# Patient Record
Sex: MALE | Race: WHITE
[De-identification: names, ages, dates, MRNs, and addresses within clinical notes are randomized per-mention and may not be internally consistent; named-entity substitution may affect disease eponyms.]

---

## 2020-01-09 ENCOUNTER — HOSPITAL ENCOUNTER (OUTPATIENT)
Dept: HOSPITAL 74 - JASU-SURG | Age: 75
Discharge: HOME | End: 2020-01-09
Attending: OPHTHALMOLOGY
Payer: COMMERCIAL

## 2020-01-09 VITALS — DIASTOLIC BLOOD PRESSURE: 72 MMHG | TEMPERATURE: 98.2 F | HEART RATE: 59 BPM | SYSTOLIC BLOOD PRESSURE: 156 MMHG

## 2020-01-09 VITALS — BODY MASS INDEX: 34.4 KG/M2

## 2020-01-09 DIAGNOSIS — E11.9: ICD-10-CM

## 2020-01-09 DIAGNOSIS — I10: ICD-10-CM

## 2020-01-09 DIAGNOSIS — H26.9: Primary | ICD-10-CM

## 2020-01-09 PROCEDURE — 08RJ3JZ REPLACEMENT OF RIGHT LENS WITH SYNTHETIC SUBSTITUTE, PERCUTANEOUS APPROACH: ICD-10-PCS | Performed by: OPHTHALMOLOGY

## 2020-01-09 NOTE — HP
History & Physical Update





- History


History: No Change





- Physical


Physical: No Change





- Assessment


Assessment: No Change





- Plan


Plan: No Change (H and P reviewed by Dr. Ayala from 1/7/2020 no changes)

## 2020-01-09 NOTE — OP
DATE OF OPERATION:  

 

DATE OF DICTATION:  01/09/2020

 

PREOPERATIVE DIAGNOSIS:  Cortical cataract, right eye.

 

POSTOPERATIVE DIAGNOSIS:  Cortical cataract, right eye.

 

PROCEDURE:  Phacoemulsification and cataract extraction with insertion of 
posterior

chamber intraocular lens, right eye.

 

SURGEON:  Annmarie Roblero MD

 

ASSISTANT:  None.

 

ANESTHESIA:  Topical.

 

ANESTHESIOLOGIST:  Monik Davenport CRNA

 

OPERATIVE PROCEDURE:  The patient received Tetracaine eye drops and was gently

sedated and prepped and draped in the usual sterile fashion so as to expose 
only the

right eye.  Ophthalmic Betadine was instilled into the inferior fornix and 
lashes

were taped out of the surgical field.  An eyelid speculum was placed into the 
right

eye.  Paracentesis was made in superior temporal clear cornea at the limbus.  
Then,

0.5 mL of nonpreserved lidocaine 1% was injected into the anterior chamber and 
then 1

mL of dilute epinephrine 1:10,000 was injected into the anterior chamber to 
improve

pupillary dilation.  Viscoelastic material was instilled into the anterior 
chamber

via the paracentesis.  A 2.4-mm keratome blade was then used to create the main

incision in temporal clear cornea at the limbus.  A continuous curvilinear

capsulorhexis was performed using a cystotome and Utrata forceps.  
Hydrodissection of

the lens cortex was performed using BSS on a cannula until the nucleus was 
noted to

be freely rotating.  The phacoemulsification tip was then inserted via the main 
wound

and used to scope 2 perpendicular grooves into the lens nucleus.  The nucleus 
was

cracked into 4 quadrants.  Each quadrant was lifted out of the capsule into the 
iris

plane and individually phacoemulsified.  The remaining cortical material was 
then

aspirated using the irrigation/aspiration port.  The capsular bag was inflated 
using

ProVisc and a preloaded AcrySof lens model AU00T0 power +19.5 diopters was 
injected

into the capsular bag.  It was centered using a Sinskey hook.  The residual

viscoelastic material was removed from the anterior chamber using irrigation and

aspiration.  The wound edges were hydrated using BSS.  The wound was tested for

leakage and was found to be watertight.  Tobradex ointment was placed in the eye
, and

the speculum was removed from the eye, and the eyelid was closed.  A sterile 
dressing

and shield were placed over the eye.  The patient was transferred to the 
recovery

room in stable condition, told to follow up in 1 day.

 

 

ROSA [id number]

DD: 01/09/2020 12:13

DT: 01/09/2020 12:39

Job #:  [id number]

MTDD

## 2020-01-09 NOTE — OP
Ophthalmology Operative Note


Pre-Operative Diagnosis: Cataract (cortical)


Affected Eye: Right


Operation: Phacoemulsification and cataract extraction with PCIOL


Findings: 


cortical cataract , floppy iris


Post-Operative Diagnosis: Same as Pre-op


Assistant: None


Anesthesiologist: Lola Ribeiro


Anesthesia: Topical


Specimens Removed: none


Estimated blood loss: < 1 cc


Drains & Tubes with Location: none


Operative Report Dictated: Yes

## 2020-01-09 NOTE — HP
- Patient


Scheduled date of Surgery: 01/09/20


Scheduled Surgical Procedure: Phacoemulsification and cataract extraction with 

PCIOL


Affected Eye: Right


Chief Complaint (Indication for surgery): Decreased vision affecting ADLs





- Ocular History


Other Eye History: Other (NPDR, glaucoma suspect, pingueculum)


Eye Medications: vigamox


Previous Eye Surgery: none





- Medical History


Illnesses: Hypertension, Hypercholesterolemia, Diabetes


Current Medications: 


Ambulatory Orders





Amlodipine Besylate 5 mg PO DAILY 01/08/20 


Gemfibrozil [Lopid -] 600 mg PO BID 01/08/20 


Glimepiride 2 mg PO DAILY 01/08/20 


Losartan Potassium 50 mg PO DAILY 01/08/20 


Metoprolol Tartrate 50 mg PO DAILY 01/08/20 


Omeprazole 20 mg PO DAILY 01/08/20 


Simvastatin 40 mg PO DAILY 01/08/20 


Sitagliptin Phosphate [Januvia] 100 mg PO DAILY 01/08/20 








Allergies/Adverse Reactions: 


 Allergies











Allergy/AdvReac Type Severity Reaction Status Date / Time


 


No Known Drug Allergies Allergy   Verified 01/09/20 09:04














Ocular Examination





- Best Corrected Visual Acuity


Distance: Right eye: CF


Distance: Left eye: 20/100





- External/Slit Lamp Examination


Abnormalities: floppy eyelid, pingueculum





- Intraocular Pressure


Intraocular Pressure - Right eye: 12


Intraocular Pressure-Left eye: 14





- Lens


Lens: 2-3+ cortical 2+ NS





- Vitreous/Retina


Vitreous/Retina: C:D 0.4 m/v/p wnl





- Special Examination


M - Right eye: +1.75-0.75 x 075


M - Left eye: +0.50-0.25 x 005


K - Right eye: 42.5/43 x 180


K - Left eye: 42.25/42.50 x 175


AL - Right eye: 24.36


AL - Left eye: 25.10


IOL bag: +19.5 AUOOTO


IOL sulcus: +18.5 MN60 AC


IOL AC: +16.5 MTA 4U0





- Impression


Impression: Cataract Right Eye (cortical)





- Plan


Plan: Phacoemulsification and cataract extraction - IOL Right eye


Post-hospital care will be provided in office on: 01/10/20

## 2020-01-30 ENCOUNTER — HOSPITAL ENCOUNTER (OUTPATIENT)
Dept: HOSPITAL 74 - JASU-SURG | Age: 75
Discharge: HOME | End: 2020-01-30
Attending: OPHTHALMOLOGY
Payer: COMMERCIAL

## 2020-01-30 VITALS — HEART RATE: 58 BPM

## 2020-01-30 VITALS — SYSTOLIC BLOOD PRESSURE: 180 MMHG | DIASTOLIC BLOOD PRESSURE: 70 MMHG

## 2020-01-30 VITALS — TEMPERATURE: 98 F

## 2020-01-30 VITALS — BODY MASS INDEX: 34.4 KG/M2

## 2020-01-30 DIAGNOSIS — E11.9: ICD-10-CM

## 2020-01-30 DIAGNOSIS — H26.9: Primary | ICD-10-CM

## 2020-01-30 DIAGNOSIS — Z79.4: ICD-10-CM

## 2020-01-30 PROCEDURE — 08RK3JZ REPLACEMENT OF LEFT LENS WITH SYNTHETIC SUBSTITUTE, PERCUTANEOUS APPROACH: ICD-10-PCS | Performed by: OPHTHALMOLOGY

## 2020-01-30 NOTE — OP
DATE OF OPERATION:  

 

DATE OF DICTATION:  01/30/2020

 

PREOPERATIVE DIAGNOSIS:  Cortical cataract, left eye.

 

POSTOPERATIVE DIAGNOSIS:  Cortical cataract, left eye.

 

PROCEDURE:  Phacoemulsification and cataract extraction with insertion of posterior

chamber intraocular lens, left eye.

 

SURGEON:  Annmarie Roblero MD

 

ASSISTANT:  None.

 

ANESTHESIA:  Retrobulbar block.

 

ANESTHESIOLOGIST:  Michaela Malin MD 

 

OPERATIVE PROCEDURE:  Following satisfactory intravenous sedation, the patient

received local anesthesia using a 50/50 mixture of lidocaine 2% and Marcaine 0.75%. 

A Van Lint lid block was delivered to the left eye using 4 mL of the mixture and a

retrobulbar injection using 3 mL of the mixture.  The patient was then prepped and

draped in the usual sterile fashion so as to expose only the left eye.  Ophthalmic

Betadine was instilled into the inferior fornix, and the lashes were taped out of the

surgical field.  An eyelid speculum was placed in the left eye.  Paracentesis was

made in inferior clear cornea at the limbus.  A 1 mL of dilute epinephrine 1:10,000

was injected into the anterior chamber.  Viscoelastic material was instilled into the

anterior chamber via the paracentesis.  A 2.4-mm keratome was then used to create the

main incision in temporal clear cornea at the limbus.  A continuous curvilinear

capsulorrhexis was performed using a cystotome and Utrata forceps.  Hyrodissection of

the lens cortex was performed using BSS on a cannula until the nucleus was noted to

be freely rotating.  The phacoemulsification tip was then inserted via the main wound

and used to sculpt 2 perpendicular grooves into the lens nucleus.  The nucleus was

cracked into 4 quadrants.  Each quadrant was lifted out of the capsule into the iris

plane and individually phacoemulsified.  The remaining cortical material was then

aspirated using the irrigation and aspiration port.  The capsular bag was inflated

using Provisc, and a preloaded Hoya lens model AU00T0, power +18.5 diopter was

injected into the capsular bag and centered using a Sinskey hook.  The wound was

tested for leakage.  It was found to be watertight.  However, one 10-0 nylon suture

was placed across the wound because the patient will need subsequent intravitreal

injections.  Tobradex ointment was placed into the eye, and a speculum was removed

from the eye, and the eyelid was closed.  A sterile dressing and shield were placed

over the eye, and the patient was transferred to the recovery room in stable

condition, told to follow up in 1 day.

 

 

ANNMARIE ROBLERO M.D.

 

[id number]

DD: 01/30/2020 11:07

DT: 01/30/2020 12:56

Job #:  [id number]

## 2020-01-30 NOTE — HP
History & Physical Update





- History


History: No Change





- Physical


Physical: No Change





- Assessment


Assessment: No Change





- Plan


Plan: No Change (Reviewed Dr. Ayala's H and P from 1/7/2020 no changes)

## 2020-01-30 NOTE — OP
Ophthalmology Operative Note


Pre-Operative Diagnosis: Cataract (cortical)


Affected Eye: Left


Operation: Phacoemulsification and cataract extraction with PCIOL


Findings: 





cortical cataract left eye


Post-Operative Diagnosis: Same as Pre-op


Assistant: None


Anesthesiologist: Michaela Malin


Anesthesia: Retrobulbar


Specimens Removed: none


Estimated blood loss: < 1cc


Drains & Tubes with Location: none


Operative Report Dictated: Yes

## 2020-01-30 NOTE — HP
- Patient


Scheduled date of Surgery: 01/30/20


Scheduled Surgical Procedure: Phacoemulsification and cataract extraction with 

PCIOL


Affected Eye: Left


Chief Complaint (Indication for surgery): Decreased vision affecting ADLs





- Ocular History


Other Eye History: BDR ([facility])


Eye Medications: vigamox , prolensa


Previous Eye Surgery: s/p ce/pciol OD





- Medical History


Illnesses: Hypertension, Hypercholesterolemia, Diabetes


Current Medications: 


Ambulatory Orders





Amlodipine Besylate 5 mg PO DAILY 01/08/20 


Gemfibrozil [Lopid -] 600 mg PO BID 01/08/20 


Glimepiride 2 mg PO DAILY 01/08/20 


Losartan Potassium 50 mg PO DAILY 01/08/20 


Metoprolol Tartrate 50 mg PO DAILY 01/08/20 


Omeprazole 20 mg PO DAILY 01/08/20 


Simvastatin 40 mg PO DAILY 01/08/20 


Sitagliptin Phosphate [Januvia] 100 mg PO DAILY 01/08/20 


Amlodipine Besylate 5 mg PO DAILY 01/30/20 








Allergies/Adverse Reactions: 


 Allergies











Allergy/AdvReac Type Severity Reaction Status Date / Time


 


No Known Drug Allergies Allergy   Verified 01/30/20 07:26














Ocular Examination





- Best Corrected Visual Acuity


Distance: Right eye: 20/50


Distance: Left eye: 20/200





- External/Slit Lamp Examination


Abnormalities: floppy eyelid, pingueculum





- Intraocular Pressure


Intraocular Pressure - Right eye: 15


Intraocular Pressure-Left eye: 15





- Lens


Lens: 2+ NS 2-3+ cortical change





- Vitreous/Retina


Vitreous/Retina: C:D 0.4 macular edema, rare dot blot heme





- Special Examination


M - Right eye: +0.75 -2.00 x 100


M - Left eye: +0.50-0.25 x 005


K - Right eye: 43.5/44.75 x020


K - Left eye: 41.75/42.50 x180 


AL - Right eye: 24.36


AL - Left eye: 25.10


IOL bag: +18.5 AUOOTO


IOL sulcus: +18.0 MN60AC


IOL AC: +16.0 MTA 4UO





- Impression


Impression: Cataract Left Eye (cortical)





- Plan


Plan: Phacoemulsification and cataract extraction - IOL Left eye


Post-hospital care will be provided in office on: 01/31/20